# Patient Record
Sex: MALE | Race: WHITE | Employment: FULL TIME | URBAN - METROPOLITAN AREA
[De-identification: names, ages, dates, MRNs, and addresses within clinical notes are randomized per-mention and may not be internally consistent; named-entity substitution may affect disease eponyms.]

---

## 2020-10-12 ENCOUNTER — OFFICE VISIT (OUTPATIENT)
Dept: FAMILY MEDICINE | Facility: CLINIC | Age: 65
End: 2020-10-12
Payer: COMMERCIAL

## 2020-10-12 DIAGNOSIS — Z23 NEED FOR PROPHYLACTIC VACCINATION AND INOCULATION AGAINST INFLUENZA: Primary | ICD-10-CM

## 2020-10-12 PROCEDURE — 90471 IMMUNIZATION ADMIN: CPT

## 2020-10-12 PROCEDURE — 99207 PR NO CHARGE NURSE ONLY: CPT

## 2020-10-12 PROCEDURE — 90686 IIV4 VACC NO PRSV 0.5 ML IM: CPT

## 2020-12-10 ENCOUNTER — OFFICE VISIT (OUTPATIENT)
Dept: FAMILY MEDICINE | Facility: CLINIC | Age: 65
End: 2020-12-10
Payer: COMMERCIAL

## 2020-12-10 VITALS
SYSTOLIC BLOOD PRESSURE: 145 MMHG | BODY MASS INDEX: 25.18 KG/M2 | HEIGHT: 69 IN | HEART RATE: 60 BPM | DIASTOLIC BLOOD PRESSURE: 85 MMHG | WEIGHT: 170 LBS | OXYGEN SATURATION: 99 %

## 2020-12-10 DIAGNOSIS — Z00.8 ENCOUNTER FOR BIOMETRIC SCREENING: Primary | ICD-10-CM

## 2020-12-10 LAB
CHOLEST SERPL-MCNC: 289 MG/DL
GLUCOSE SERPL-MCNC: 98 MG/DL (ref 70–99)
HDLC SERPL-MCNC: 48 MG/DL
LDLC SERPL CALC-MCNC: 218 MG/DL
NONHDLC SERPL-MCNC: 241 MG/DL
TRIGL SERPL-MCNC: 115 MG/DL

## 2020-12-10 PROCEDURE — 80061 LIPID PANEL: CPT | Performed by: PHYSICIAN ASSISTANT

## 2020-12-10 PROCEDURE — 36415 COLL VENOUS BLD VENIPUNCTURE: CPT | Performed by: PHYSICIAN ASSISTANT

## 2020-12-10 PROCEDURE — 99207 PR NO CHARGE NURSE ONLY: CPT | Performed by: PHYSICIAN ASSISTANT

## 2020-12-10 PROCEDURE — 82947 ASSAY GLUCOSE BLOOD QUANT: CPT | Performed by: PHYSICIAN ASSISTANT

## 2020-12-10 ASSESSMENT — MIFFLIN-ST. JEOR: SCORE: 1551.49

## 2020-12-10 NOTE — PATIENT INSTRUCTIONS
"Thank you for completing your biometric screening on 12/10/2020.    Your laboratory results from this visit are:    Triglycerides (TRG) measures the amount of fat in your bloodstream. High levels may raise the risk of heart disease, especially in women.  Normal = under 150 mg/dl  Abnormal = over 150 mg/dl   Your value:   Triglycerides   Date Value Ref Range Status   12/10/2020 115 <150 mg/dL Final     Comment:     Fasting specimen        Fasting glucose measures the sugar circulating in your blood stream which is used for energy by all organs, particularly the brain and muscles.    Normal = 70 - 99 mg/dl  Prediabetes = 100 - 125 mg/dl  Diabetes = more than 125 mg/dl   Your value:   Glucose   Date Value Ref Range Status   12/10/2020 98 70 - 99 mg/dL Final     Comment:     Fasting specimen        Total cholesterol measures the total amount of cholesterol in your blood. High cholesterol levels can indicate fatty buildup in your arteries.  Normal = under 200 mg/dl   Borderline = 200 - 239 mg/dl  Abnormal = over 239 mg/dl   Your value:   Cholesterol   Date Value Ref Range Status   12/10/2020 289 (H) <200 mg/dL Final     Comment:     Desirable:       <200 mg/dl        High Density Lipoprotein (HDL) is a measurement of good fat.  A low HDL puts you at higher risk for coronary disease.  Normal Men: Above 39 mg/dl  Normal Women: Above 49 mg/dl  Your value:   HDL Cholesterol   Date Value Ref Range Status   12/10/2020 48 >39 mg/dL Final       Low Density Lipoprotein (LDL) measures the type of cholesterol is referred to by some as \"bad cholesterol.\"  An elevated LDL puts you at a greater risk for coronary disease.  Normal = under 100 mg/dl  Borderline = 100 - 129 mg/dl  Abnormal = above 129 mg/dl   Your value:   LDL Cholesterol Calculated   Date Value Ref Range Status   12/10/2020 218 (H) <100 mg/dL Final     Comment:     Above desirable:  100-129 mg/dl  Borderline High:  130-159 mg/dL  High:             160-189 mg/dL  Very " high:       >189 mg/dl         Follow-Up    We recommend the following steps based on today's results:  Follow-up with your primary care provider within 1 month to discuss labs.    As a reminder, you may also be due for the following screening(s):  No Additional Follow-up needed at this time    If you are due, please call your PCP to schedule.

## 2020-12-10 NOTE — PROGRESS NOTES
"  Patient Instructions     Thank you for completing your biometric screening on 12/10/2020.    Your laboratory results from this visit are:    Triglycerides (TRG) measures the amount of fat in your bloodstream. High levels may raise the risk of heart disease, especially in women.  Normal = under 150 mg/dl  Abnormal = over 150 mg/dl   Your value:   Triglycerides   Date Value Ref Range Status   12/10/2020 115 <150 mg/dL Final     Comment:     Fasting specimen        Fasting glucose measures the sugar circulating in your blood stream which is used for energy by all organs, particularly the brain and muscles.    Normal = 70 - 99 mg/dl  Prediabetes = 100 - 125 mg/dl  Diabetes = more than 125 mg/dl   Your value:   Glucose   Date Value Ref Range Status   12/10/2020 98 70 - 99 mg/dL Final     Comment:     Fasting specimen        Total cholesterol measures the total amount of cholesterol in your blood. High cholesterol levels can indicate fatty buildup in your arteries.  Normal = under 200 mg/dl   Borderline = 200 - 239 mg/dl  Abnormal = over 239 mg/dl   Your value:   Cholesterol   Date Value Ref Range Status   12/10/2020 289 (H) <200 mg/dL Final     Comment:     Desirable:       <200 mg/dl        High Density Lipoprotein (HDL) is a measurement of good fat.  A low HDL puts you at higher risk for coronary disease.  Normal Men: Above 39 mg/dl  Normal Women: Above 49 mg/dl  Your value:   HDL Cholesterol   Date Value Ref Range Status   12/10/2020 48 >39 mg/dL Final       Low Density Lipoprotein (LDL) measures the type of cholesterol is referred to by some as \"bad cholesterol.\"  An elevated LDL puts you at a greater risk for coronary disease.  Normal = under 100 mg/dl  Borderline = 100 - 129 mg/dl  Abnormal = above 129 mg/dl   Your value:   LDL Cholesterol Calculated   Date Value Ref Range Status   12/10/2020 218 (H) <100 mg/dL Final     Comment:     Above desirable:  100-129 mg/dl  Borderline High:  130-159 mg/dL  High:          "    160-189 mg/dL  Very high:       >189 mg/dl         Follow-Up    We recommend the following steps based on today's results:  Follow-up with your primary care provider within 1 month to discuss labs.    As a reminder, you may also be due for the following screening(s):  No Additional Follow-up needed at this time    If you are due, please call your PCP to schedule.

## 2021-04-13 ENCOUNTER — OFFICE VISIT (OUTPATIENT)
Dept: FAMILY MEDICINE | Facility: CLINIC | Age: 66
End: 2021-04-13

## 2021-04-13 DIAGNOSIS — Z78.9 PARTICIPANT IN HEALTH AND WELLNESS PLAN: Primary | ICD-10-CM

## 2021-04-13 NOTE — PROGRESS NOTES
Appointment Notes:    Motivators:  -custodial    Goals:  - 2d/week (Tues/Thurs) strength & walk M/W 20 mins    Discussion:  - Treadmill & weights (run 3.5-4 mi sundays, strength on Saturday)      PALMIRA Cristobal, [facility] &

## 2021-05-20 ENCOUNTER — OFFICE VISIT (OUTPATIENT)
Dept: FAMILY MEDICINE | Facility: CLINIC | Age: 66
End: 2021-05-20

## 2021-05-20 DIAGNOSIS — Z78.9 PARTICIPANT IN HEALTH AND WELLNESS PLAN: Primary | ICD-10-CM

## 2021-05-20 NOTE — PROGRESS NOTES
Appointment Notes    Check-in:  - Outdoor projects are keeping busy, push mower etc.   - Noticing low energy in afternoon on work days, no breakfast &  eating light salad for lunch, discussed potentially adding in small afternoon snack that contains protein & fat.   - No energy to complete exercise after work, sitting down to rest for a second & falling asleep. Waking up feeling refreshed but not like exercising then.  - Eating oatmeal breakfast on weekends but on not week days.    Goals:  - 1 weekday run  -  Pay attention to energy levels on days eating breakfast VS days not eating breakfast  & see if there is any difference.    PALMIRA Cristobal, [facility]         no

## 2021-07-19 ENCOUNTER — OFFICE VISIT (OUTPATIENT)
Dept: FAMILY MEDICINE | Facility: CLINIC | Age: 66
End: 2021-07-19

## 2021-07-19 DIAGNOSIS — Z78.9 PARTICIPANT IN HEALTH AND WELLNESS PLAN: Primary | ICD-10-CM

## 2021-07-19 NOTE — PROGRESS NOTES
"Notes:  - \"Not noticing any difference on the days I'm eating breakfasts during the week days so dropped it & only eating breakfast on the weekends\"  - Practicing more mindful eating overall, more awareness around portions & quality of foods.     PALMIRA Cristobal, [facility]   "

## 2022-03-02 ENCOUNTER — OFFICE VISIT (OUTPATIENT)
Dept: FAMILY MEDICINE | Facility: CLINIC | Age: 67
End: 2022-03-02
Payer: COMMERCIAL

## 2022-03-02 DIAGNOSIS — Z78.9 PARTICIPANT IN HEALTH AND WELLNESS PLAN: Primary | ICD-10-CM

## 2022-03-02 NOTE — PROGRESS NOTES
Discussion:    Biometric F/U:   BMI 24.5, 173.6 lbs, 37 inch waist, /78, Triglycerides 78, Glucose 85, Non- (, HDL 45) Ratio 5.0    Discussed effect elevated BP has on the body & things that can affect BP (exercise, stress, weight, diet, sleep)    Discussed impact elevated Non-HDL levels have on health & behaviors that can affect these levels (consumption of healthy fats, limiting processed foods, sugar, white flour foods, avoiding trans fats, exercise, stress, weight) as well as uncontrollable factors (sex, genetics, age).     Lifestyle Habits:  Diet/ Eating Habits: tuna salad, bottled water, salad & handful or grapes    Physical Activity: Strength (pull up bar, hand weights, push ups) Saturdays & Sundays, run on Sundays (discussed possibility of adding in lower body strength such as dead lifts & squats)     Sleep: 10/11- 6:30 am    Stress: work is busy & stressful, 8/10    Goals:  - Swap out grapes for berries   - Add almonds/ walnuts into diet    Motivation:   - Desire to be able to enjoy a long & active FDC    PALMIRA Cristobal, [facility]

## 2022-03-09 ENCOUNTER — OFFICE VISIT (OUTPATIENT)
Dept: FAMILY MEDICINE | Facility: CLINIC | Age: 67
End: 2022-03-09
Payer: COMMERCIAL

## 2022-03-09 VITALS — DIASTOLIC BLOOD PRESSURE: 70 MMHG | SYSTOLIC BLOOD PRESSURE: 146 MMHG

## 2022-03-09 DIAGNOSIS — I10 HTN (HYPERTENSION): Primary | ICD-10-CM

## 2022-03-09 PROCEDURE — 99207 PR NO CHARGE LOS: CPT

## 2022-03-09 NOTE — PROGRESS NOTES
Patient was in clinic today for a BP check only. 146/70    Will provide W/C with numbers    Aniya Escobar, B[facility]

## 2022-10-05 ENCOUNTER — OFFICE VISIT (OUTPATIENT)
Dept: FAMILY MEDICINE | Facility: CLINIC | Age: 67
End: 2022-10-05
Payer: COMMERCIAL

## 2022-10-05 DIAGNOSIS — Z23 NEED FOR PROPHYLACTIC VACCINATION AND INOCULATION AGAINST INFLUENZA: Primary | ICD-10-CM

## 2022-10-05 PROCEDURE — 90471 IMMUNIZATION ADMIN: CPT

## 2022-10-05 PROCEDURE — 90662 IIV NO PRSV INCREASED AG IM: CPT
